# Patient Record
(demographics unavailable — no encounter records)

---

## 2024-12-17 NOTE — HISTORY OF PRESENT ILLNESS
[FreeTextEntry1] : ELEANOR LOPEZ is a 71 year old female with a past medical history of:   Bronchospastic airway disease/asthma. History of depression. Hepatitis C is resolved. Hypothyroidism on replacement therapy. Truncal overweight state CTA with less than 50% stenosis ostial lAD and RCA. Coronary calcium score, which was 20. Chronic mediastinal lymph node changes were noted. Hiatus hernia and cholelithiasis noted. carotid Doppler study showed minimal carotid atherosclerosis. HLD  She is now enrolled in a clinical trial for tirzepatide at [facility]. Considering restarting statin. Requesting follow-up fasting lipid panel. Active with no new exertional complaints. Weight is down 20lbs.   Last seen 9/23/24. In the interim reports she had [place] bacteria in a stool sample, following with Dr. Covarrubias. Reports last week had diarrhea, fever, and some weakness. Was unsure if she had flu or norovirus. Also has been experiencing intermittent dizziness, ?palpitations, double vision, and tingling tongue---> which she states she has had before in the setting hyperthyroidism. Denies CP, SOB, syncope, claudication, and edema. It seems she has been adjusting her thyroid medications; unclear if this is under a guidance of a physician. No smoking hx. Does 2 senior fitness classes a week.  Testing:  EKG 12/17/24: SR at 74 bpm, NH interval 156 ms, QTc 389 ms, Q waves III, PRWP, nonspecific ST-T wave abnormalities   Labs 9/2024: WBC 8.64, Hgb 13.3, HCT 40, plt 106, Na 135, K 4.3, Cr 1.05, AST 40, ALT 36, Ca 9.9, Mag 2.3, Chol 193, Trigs 104, HDL 45, , LPa 49, TSH 1.49.  Carotid u/s 10/31/23: CONCLUSIONS: 1. Minimal-mild non obstructive atherosclerosis noted. 2. Vertebral arteries: antegrade flow bilaterally. 3. Compared to prior on 07/2021- No significant change  Abd u/s 10/31/23: CONCLUSIONS: 1. No evidence of abdominal aortic aneurysm. 2. No clinically significant atherosclerosis noted in the abdominal aorta. 3. No prior  Echo 5/2023: CONCLUSIONS: 1. The left ventricular systolic function is normal with an ejection fraction visually estimated at 55 to 60  %. 2. There is mild (grade 1) left ventricular diastolic dysfunction. 3. Normal right ventricular cavity size, normal wall thickness and normal systolic function. 4. Mild mitral regurgitation. 5. No echocardiographic evidence of pulmonary hypertension. 6. No pericardial effusion seen. 7. Mild tricuspid regurgitation. 8. Compared to the transthoracic echocardiogram performed on 7/9/2021 there have been no significant  interval changes.  Remote/Ambulatory Rhythm Monitoring: Event monitor. Minimal extrasystolic arrhythmias. Short runs of PSVT. Event monitor May 3, 2023. Brief SVT    Zio 9/2023: SR  bpm, average HR 67 bpm, 1 run of AT 10 beats, PAC/PVC burden <1%.  Stress Test: Nuclear myocardial perfusion scan. March 2, 2022. Nonischemic.     CT/MRI: Coronary calcium score. 2019. LAD. 20 CCS score. Cardiac CT. May 4, 2023. Mild distal LAD and mid RCA stenosis. Calcium score of 30.    Carotid u/s November 7, 2022. Bilateral carotid Doppler study. Mild nonobstructive disease.

## 2025-02-13 NOTE — REASON FOR VISIT
[Symptom and Test Evaluation] : symptom and test evaluation [Hyperlipidemia] : hyperlipidemia [FreeTextEntry3] : Dr. Jackson [FreeTextEntry1] : Consent: Patient consented to virtual video visit.  She is at home.  I am in [facility] office Time: A total of 25 minutes was spent in review of pertinent medical records, discussion with the patient, evaluation of the patient problem and coordination of the care plan as part of this online visit.  No physical examination was performed as this visit was performed virtually with exceptions as noted below.

## 2025-02-13 NOTE — CARDIOLOGY SUMMARY
[de-identified] : 5/3/2023 SR with no acute changes\par  7/2/2021 normal sinus rhythm\par  January 6, 2023.  Normal sinus rhythm [de-identified] : Event monitor.  Minimal extrasystolic arrhythmias.  Short runs of PSVT. Event monitor May 3, 2023.  Brief SVT Event monitor.  December 2024.  4 beat NSVT, brief PSVT and extrasystolic arrhythmias. [de-identified] : Nuclear myocardial perfusion scan.  March 2, 2022.  Nonischemic. Exercise treadmill stress test.  February 10, 2025.  Which 75% predicted maximum heart rate 6 minutes of Raymond protocol.  Nonischemic symptoms.  No significant high risk arrhythmia.  Nondiagnostic due to inability to achieve the goal by EKG. [de-identified] : July 9, 2021 EF 60% mild mitral regurgitation normal left atrium May 17, 2023 EF 55 to 60% mild mitral regurgitation mild tricuspid regurgitation   January 14 2025 EF's 60 to 65%. [de-identified] : Coronary calcium score.  2019.  LAD.  20 CCS score.\par  Cardiac CT.  May 4, 2023.  Mild distal LAD and mid RCA stenosis.  Calcium score of 30. [de-identified] : Bilateral carotid Doppler study July 9, 2021 minimal nonobstructive disease November 7, 2022.  Bilateral carotid Doppler study.  Mild nonobstructive disease. Bilateral carotid Doppler study 2025.  Mild nonobstructive atherosclerosis.

## 2025-02-13 NOTE — DISCUSSION/SUMMARY
[FreeTextEntry1] : ELEANOR LOPEZ  is a 71 year F  who presents today  with the above history and the following active issues.  Obesity.  Coronary atherosclerosis.  Dyslipidemia.  Continue use of Zepbound.  Strongly recommend to consider re-trial of statin and will discuss this further when reviewing fasting labs over the phone.   Coronary calcification.  Carotid atherosclerosis.  Nonobstructive CAD Symptoms of shortness of breath, fatigue.  Brief PSVT.  Coronary CTA nonobstructive disease.  Moderate coronary calcification score Continue with metoprolol. Aspirin 81 mg daily recommended. Again reviewed risk benefits alternatives of not using cholesterol-lowering agents.  Unable to tolerate statins.  Even at the lowest dose.  Does not want to start Repatha till she is off experimental medication for weight loss.  At present Zetia 10 mg prescribed.  Risk benefits alternatives side effects reviewed.  Repeat labs to be done.  She understands lowering LDL cholesterol to less than 70 is important specifically with nonobstructive CAD involving proximal LAD.   If there are any change in her symptoms she will contact us immediately.  PSVT.  Brief.  Mildly symptomatic.  Controlled with metoprolol.  Worsening symptoms would require electrophysiology guided therapy.  At present continue with beta-blockers.  Risk benefits alternatives reviewed.  Carotid atherosclerosis.  Aspirin 81 mg. Dyslipidemia management as discussed above.  Mild mitral regurgitation nonrheumatic preserved LV systolic function normal pulmonary pressures.  Stable echocardiogram findings reviewed  Essential hypertension.  Blood pressure well controlled on my assessment. Follow blood pressure closely.  Goal less than 130/80. Low-salt diet.  No alcohol.  Regular walking activity as permitted by recent injury.  Weight reduction.  Red flag symptoms which would warrant sooner emergent evaluation reviewed with the patient.  Questions and concerns were addressed and answered.  Limitations of non-invasive testing reviewed All the above were at length reviewed. Answered all the questions. Thank you very much for this kind referral. Please do not hesitate to give me a call for any question. Part of this transcription was done with voice recognition software and phonetically similar errors are common. I apologize for that. Please do not hesitate to call for any questions due to above.   Sincerely,  Hai James MD, [facility], [place]

## 2025-02-13 NOTE — HISTORY OF PRESENT ILLNESS
[FreeTextEntry1] : ELEANOR LOPEZ  is a 71-year-old female was seen in the office for virtual visit to review multiple cardiovascular test. She is now enrolled in a clinical trial for tirzepatide at [facility]. She has not been taking her statins. Active with no new exertional complaints. Weight is down 20lbs.  Today she denies chest pain, pressure, unusual shortness of breath, lightheadedness, dizziness, near syncope or syncope.    Her medical history includes Bronchospastic airway disease/asthma. History of depression. Hepatitis C is resolved. Hypothyroidism on replacement therapy. Truncal overweight state CTA with less than 50% stenosis ostial lAD and RCA.  Coronary calcium score, which was 20. Chronic mediastinal lymph node changes were noted. Hiatus hernia and cholelithiasis noted. carotid Doppler study showed minimal carotid atherosclerosis.

## 2025-02-13 NOTE — ASSESSMENT
[FreeTextEntry1] : I have reviewed multiple cardiovascular test on February 13, 2025.  Which includes event monitor, echocardiogram, carotid Doppler study, exercise treadmill stress test.  Labs from December 18, 2025 sodium 137 potassium 3.9 Creatinine stable.  LFT normal HDL 44  total cholesterol/triglycerides 210 hemoglobin A1c 4.9

## 2025-02-13 NOTE — PHYSICAL EXAM
[No Acute Distress] : no acute distress [No Respiratory Distress] : no respiratory distress  [Normal Speech] : normal speech [Alert and Oriented] : alert and oriented

## 2025-06-12 NOTE — DISCUSSION/SUMMARY
[FreeTextEntry1] : ELEANOR LOPEZ  is a 71 year F  who presents today  with the above history and the following active issues. Left arm numbness.  EKG normal.  Nonexertional.  Does have CAD with nonobstructive CTA.  Exercise treadmill stress test nondiagnostic for ischemia due to lower heart rate.  But at the level of exercise she did she did not have any significant symptoms around 75% of predicted maximum heart rate. Most likely radiculopathy.  Low probability of unstable CAD.  There are any associated symptoms or worsening symptoms recommend to contact us or call 911 for further evaluation.  Visual disturbances.  Unclear etiology.  Echocardiogram stable without PFO.  Neurological evaluation was negative on multiple ER visits.  Being followed with neurologist.  No significant arrhythmias.  Obesity.  Coronary atherosclerosis.  Dyslipidemia.  Continue use of Zepbound.  Strongly recommend to consider re-trial of statin and will discuss this further when reviewing fasting labs over the phone.   Coronary calcification.  Carotid atherosclerosis.  Nonobstructive CAD Symptoms of shortness of breath, fatigue.  Brief PSVT.  Coronary CTA nonobstructive disease.  Moderate coronary calcification score Continue with metoprolol. Aspirin 81 mg daily recommended. Again reviewed risk benefits alternatives of not using cholesterol-lowering agents.  Unable to tolerate statins.  Even at the lowest dose.  Does not want to start Repatha till she is off experimental medication for weight loss.  Did not start Zetia.  Understands risk benefits alternatives and options.  She understands lowering LDL cholesterol to less than 70 is important specifically with nonobstructive CAD involving proximal LAD.   If there are any change in her symptoms she will contact us immediately.  PSVT.  Brief.  Mildly symptomatic.  Controlled with metoprolol.  Worsening symptoms would require electrophysiology guided therapy.  At present continue with beta-blockers.  Risk benefits alternatives reviewed.  Carotid atherosclerosis.  Aspirin 81 mg. Dyslipidemia management as discussed above.  Mild mitral regurgitation nonrheumatic preserved LV systolic function normal pulmonary pressures.  Stable echocardiogram findings reviewed  Essential hypertension.  Blood pressure well controlled on my assessment. Follow blood pressure closely.  Goal less than 130/80. Low-salt diet.  No alcohol.  Regular walking activity as permitted by recent injury.  Weight reduction.  Red flag symptoms which would warrant sooner emergent evaluation reviewed with the patient.  Questions and concerns were addressed and answered.  Limitations of non-invasive testing reviewed All the above were at length reviewed. Answered all the questions. Thank you very much for this kind referral. Please do not hesitate to give me a call for any question. Part of this transcription was done with voice recognition software and phonetically similar errors are common. I apologize for that. Please do not hesitate to call for any questions due to above.   Sincerely,  Hai James MD, FACC, [facility] [EKG obtained to assist in diagnosis and management of assessed problem(s)] : EKG obtained to assist in diagnosis and management of assessed problem(s)

## 2025-06-12 NOTE — CARDIOLOGY SUMMARY
[de-identified] : 5/3/2023 SR with no acute changes 7/2/2021 normal sinus rhythm January 6, 2023.  Normal sinus rhythm 6/12/2025.  Normal sinus rhythm [de-identified] : Event monitor.  Minimal extrasystolic arrhythmias.  Short runs of PSVT. Event monitor May 3, 2023.  Brief SVT Event monitor.  December 2024.  4 beat NSVT, brief PSVT and extrasystolic arrhythmias. [de-identified] : Nuclear myocardial perfusion scan.  March 2, 2022.  Nonischemic. Exercise treadmill stress test.  February 10, 2025.  Which 75% predicted maximum heart rate 6 minutes of Raymond protocol.  Nonischemic symptoms.  No significant high risk arrhythmia.  Nondiagnostic due to inability to achieve the goal by EKG. [de-identified] : July 9, 2021 EF 60% mild mitral regurgitation normal left atrium May 17, 2023 EF 55 to 60% mild mitral regurgitation mild tricuspid regurgitation   January 14 2025 EF's 60 to 65%. June 2025 preserved EF.  Mild MR.  No PFO. [de-identified] : Coronary calcium score.  2019.  LAD.  20 CCS score.\par  Cardiac CT.  May 4, 2023.  Mild distal LAD and mid RCA stenosis.  Calcium score of 30. [de-identified] : Bilateral carotid Doppler study July 9, 2021 minimal nonobstructive disease November 7, 2022.  Bilateral carotid Doppler study.  Mild nonobstructive disease. Bilateral carotid Doppler study 2025.  Mild nonobstructive atherosclerosis.

## 2025-06-12 NOTE — ASSESSMENT
[FreeTextEntry1] : Reviewed on June 12, 2025.  EKG reviewed from today.  Echocardiogram done recently reviewed. Labs from May 16, 2025  HDL 45 triglycerides 114 mildly increased TSH

## 2025-06-12 NOTE — CARDIOLOGY SUMMARY
[de-identified] : 5/3/2023 SR with no acute changes 7/2/2021 normal sinus rhythm January 6, 2023.  Normal sinus rhythm 6/12/2025.  Normal sinus rhythm [de-identified] : Event monitor.  Minimal extrasystolic arrhythmias.  Short runs of PSVT. Event monitor May 3, 2023.  Brief SVT Event monitor.  December 2024.  4 beat NSVT, brief PSVT and extrasystolic arrhythmias. [de-identified] : Nuclear myocardial perfusion scan.  March 2, 2022.  Nonischemic. Exercise treadmill stress test.  February 10, 2025.  Which 75% predicted maximum heart rate 6 minutes of Raymond protocol.  Nonischemic symptoms.  No significant high risk arrhythmia.  Nondiagnostic due to inability to achieve the goal by EKG. [de-identified] : July 9, 2021 EF 60% mild mitral regurgitation normal left atrium May 17, 2023 EF 55 to 60% mild mitral regurgitation mild tricuspid regurgitation   January 14 2025 EF's 60 to 65%. June 2025 preserved EF.  Mild MR.  No PFO. [de-identified] : Coronary calcium score.  2019.  LAD.  20 CCS score.\par  Cardiac CT.  May 4, 2023.  Mild distal LAD and mid RCA stenosis.  Calcium score of 30. [de-identified] : Bilateral carotid Doppler study July 9, 2021 minimal nonobstructive disease November 7, 2022.  Bilateral carotid Doppler study.  Mild nonobstructive disease. Bilateral carotid Doppler study 2025.  Mild nonobstructive atherosclerosis.

## 2025-06-12 NOTE — HISTORY OF PRESENT ILLNESS
[FreeTextEntry1] : ELEANOR LOPEZ  is a 71-year-old female was seen in the office for virtual visit to review echocardiogram.  And complain of left arm numbness intermittent.  Nonexertional.  No other associated symptoms. She is now enrolled in a clinical trial for tirzepatide at [facility]. She has not been taking her statins. Active with no new exertional complaints. Weight is down 20lbs.  Today she denies chest pain, pressure, unusual shortness of breath, lightheadedness, dizziness, near syncope or syncope.    Her medical history includes Bronchospastic airway disease/asthma. History of depression. Hepatitis C is resolved. Hypothyroidism on replacement therapy. Truncal overweight state CTA with less than 50% stenosis ostial lAD and RCA.  Coronary calcium score, which was 20. Chronic mediastinal lymph node changes were noted. Hiatus hernia and cholelithiasis noted. carotid Doppler study showed minimal carotid atherosclerosis.

## 2025-06-12 NOTE — REVIEW OF SYSTEMS
[Feeling Fatigued] : feeling fatigued [Negative] : Heme/Lymph [SOB] : no shortness of breath [Dyspnea on exertion] : not dyspnea during exertion [Chest Discomfort] : no chest discomfort [Lower Ext Edema] : no extremity edema [Leg Claudication] : no intermittent leg claudication [Palpitations] : no palpitations [Orthopnea] : no orthopnea [PND] : no PND [Syncope] : no syncope [Cough] : no cough [Coughing Up Blood] : no hemoptysis [Myalgia] : no myalgia [Rash] : no rash [FreeTextEntry5] : As per HPI

## 2025-06-12 NOTE — REASON FOR VISIT
[Symptom and Test Evaluation] : symptom and test evaluation [Hyperlipidemia] : hyperlipidemia [FreeTextEntry3] : Dr. Jackson

## 2025-06-12 NOTE — HISTORY OF PRESENT ILLNESS
[FreeTextEntry1] : ELEANOR LOPEZ  is a 71-year-old female was seen in the office for virtual visit to review echocardiogram.  And complain of left arm numbness intermittent.  Nonexertional.  No other associated symptoms. She is now enrolled in a clinical trial for tirzepatide at [facility]. She has not been taking her statins. Active with no new exertional complaints. Weight is down 20lbs.  Today she denies chest pain, pressure, unusual shortness of breath, lightheadedness, dizziness, near syncope or syncope.    Her medical history includes Bronchospastic airway disease/asthma. History of depression. Hepatitis C is resolved. Hypothyroidism on replacement therapy. Truncal overweight state CTA with less than 50% stenosis ostial lAD and RCA.  Coronary calcium score, which was 20. Chronic mediastinal lymph node changes were noted. Hiatus hernia and cholelithiasis noted. carotid Doppler study showed minimal carotid atherosclerosis.

## 2025-06-12 NOTE — DISCUSSION/SUMMARY
[FreeTextEntry1] : ELEANOR LOPEZ  is a 71 year F  who presents today  with the above history and the following active issues. Left arm numbness.  EKG normal.  Nonexertional.  Does have CAD with nonobstructive CTA.  Exercise treadmill stress test nondiagnostic for ischemia due to lower heart rate.  But at the level of exercise she did she did not have any significant symptoms around 75% of predicted maximum heart rate. Most likely radiculopathy.  Low probability of unstable CAD.  There are any associated symptoms or worsening symptoms recommend to contact us or call 911 for further evaluation.  Visual disturbances.  Unclear etiology.  Echocardiogram stable without PFO.  Neurological evaluation was negative on multiple ER visits.  Being followed with neurologist.  No significant arrhythmias.  Obesity.  Coronary atherosclerosis.  Dyslipidemia.  Continue use of Zepbound.  Strongly recommend to consider re-trial of statin and will discuss this further when reviewing fasting labs over the phone.   Coronary calcification.  Carotid atherosclerosis.  Nonobstructive CAD Symptoms of shortness of breath, fatigue.  Brief PSVT.  Coronary CTA nonobstructive disease.  Moderate coronary calcification score Continue with metoprolol. Aspirin 81 mg daily recommended. Again reviewed risk benefits alternatives of not using cholesterol-lowering agents.  Unable to tolerate statins.  Even at the lowest dose.  Does not want to start Repatha till she is off experimental medication for weight loss.  Did not start Zetia.  Understands risk benefits alternatives and options.  She understands lowering LDL cholesterol to less than 70 is important specifically with nonobstructive CAD involving proximal LAD.   If there are any change in her symptoms she will contact us immediately.  PSVT.  Brief.  Mildly symptomatic.  Controlled with metoprolol.  Worsening symptoms would require electrophysiology guided therapy.  At present continue with beta-blockers.  Risk benefits alternatives reviewed.  Carotid atherosclerosis.  Aspirin 81 mg. Dyslipidemia management as discussed above.  Mild mitral regurgitation nonrheumatic preserved LV systolic function normal pulmonary pressures.  Stable echocardiogram findings reviewed  Essential hypertension.  Blood pressure well controlled on my assessment. Follow blood pressure closely.  Goal less than 130/80. Low-salt diet.  No alcohol.  Regular walking activity as permitted by recent injury.  Weight reduction.  Red flag symptoms which would warrant sooner emergent evaluation reviewed with the patient.  Questions and concerns were addressed and answered.  Limitations of non-invasive testing reviewed All the above were at length reviewed. Answered all the questions. Thank you very much for this kind referral. Please do not hesitate to give me a call for any question. Part of this transcription was done with voice recognition software and phonetically similar errors are common. I apologize for that. Please do not hesitate to call for any questions due to above.   Sincerely,  Hai James MD, FACC, [facility] [EKG obtained to assist in diagnosis and management of assessed problem(s)] : EKG obtained to assist in diagnosis and management of assessed problem(s)